# Patient Record
Sex: FEMALE | Race: WHITE
[De-identification: names, ages, dates, MRNs, and addresses within clinical notes are randomized per-mention and may not be internally consistent; named-entity substitution may affect disease eponyms.]

---

## 2018-03-21 ENCOUNTER — HOSPITAL ENCOUNTER (OUTPATIENT)
Dept: HOSPITAL 45 - C.CTS | Age: 33
Discharge: HOME | End: 2018-03-21
Attending: NURSE PRACTITIONER
Payer: COMMERCIAL

## 2018-03-21 ENCOUNTER — HOSPITAL ENCOUNTER (OUTPATIENT)
Dept: HOSPITAL 45 - C.LAB1850 | Age: 33
Discharge: HOME | End: 2018-03-21
Attending: NURSE PRACTITIONER
Payer: COMMERCIAL

## 2018-03-21 DIAGNOSIS — R06.02: ICD-10-CM

## 2018-03-21 DIAGNOSIS — Z30.41: ICD-10-CM

## 2018-03-21 DIAGNOSIS — R79.89: ICD-10-CM

## 2018-03-21 DIAGNOSIS — R07.89: ICD-10-CM

## 2018-03-21 DIAGNOSIS — R94.31: Primary | ICD-10-CM

## 2018-03-21 DIAGNOSIS — R53.83: Primary | ICD-10-CM

## 2018-03-21 LAB
BASOPHILS # BLD: 0.02 K/UL (ref 0–0.2)
BASOPHILS NFR BLD: 0.2 %
BUN SERPL-MCNC: 12 MG/DL (ref 7–18)
CALCIUM SERPL-MCNC: 8.9 MG/DL (ref 8.5–10.1)
CO2 SERPL-SCNC: 24 MMOL/L (ref 21–32)
CREAT SERPL-MCNC: 0.96 MG/DL (ref 0.6–1.2)
EOS ABS #: 0.06 K/UL (ref 0–0.5)
EOSINOPHIL NFR BLD AUTO: 328 K/UL (ref 130–400)
GLUCOSE SERPL-MCNC: 75 MG/DL (ref 70–99)
HCT VFR BLD CALC: 44.4 % (ref 37–47)
HGB BLD-MCNC: 15.2 G/DL (ref 12–16)
IG#: 0.02 K/UL (ref 0–0.02)
IMM GRANULOCYTES NFR BLD AUTO: 24.4 %
LYMPHOCYTES # BLD: 2.17 K/UL (ref 1.2–3.4)
MCH RBC QN AUTO: 29.3 PG (ref 25–34)
MCHC RBC AUTO-ENTMCNC: 34.2 G/DL (ref 32–36)
MCV RBC AUTO: 85.7 FL (ref 80–100)
MONO ABS #: 0.7 K/UL (ref 0.11–0.59)
MONOCYTES NFR BLD: 7.9 %
NEUT ABS #: 5.93 K/UL (ref 1.4–6.5)
NEUTROPHILS # BLD AUTO: 0.7 %
NEUTROPHILS NFR BLD AUTO: 66.6 %
PMV BLD AUTO: 10.2 FL (ref 7.4–10.4)
POTASSIUM SERPL-SCNC: 4 MMOL/L (ref 3.5–5.1)
RED CELL DISTRIBUTION WIDTH CV: 12.9 % (ref 11.5–14.5)
RED CELL DISTRIBUTION WIDTH SD: 40.4 FL (ref 36.4–46.3)
SODIUM SERPL-SCNC: 135 MMOL/L (ref 136–145)
WBC # BLD AUTO: 8.9 K/UL (ref 4.8–10.8)

## 2018-03-21 NOTE — DIAGNOSTIC IMAGING REPORT
(CHEST FOR PE) ANGIO WITH



CT DOSE: 186.41 mGy.cm



HISTORY:  32 years-old Female with acute atypical chest pain with elevated

d-dimer level.       



TECHNIQUE: Multiple CTA images of the chest were obtained after the intravenous

administration of 83 ml Optiray 320.  Coronal and sagittal MIPS were obtained

from the axial data set and were submitted for review.  A dose lowering

technique was utilized adhering to the principles of ALARA.



COMPARISON: None.



FINDINGS: 



CTA:  

Heart is normal in size without pericardial effusion. The thoracic aorta is

normal in both course and caliber without aneurysm or dissection. The imaged

great vessels appear to be patent. The pulmonary arterial tree is opacified to

level of the segmental branches and demonstrates no focal filling defects to

suggest pulmonary thromboembolic disease. The distal segmental and subsegmental

branches are not well opacified secondary to contrast bolus timing.

 

CT CHEST:  

No dominant thyroid nodule or pathologic adenopathy identified. Minimal residual

thymic tissue of the anterior mediastinum. There is no pneumothorax, pleural

effusion, focal airspace consolidation or overt pulmonary edema. There is

minimal subsegmental bibasilar atelectasis. Calcified granuloma of the superior

segment left lower lobe. Suggested 2 mm nodule of the right lower lobe on image

133 series 4 is likely benign in a patient of this age group. Central airways

are patent.



No acute abnormality of the imaged upper abdomen. Pectus excavatum deformity of

the chest. The bilateral breast parenchyma appears unremarkable. The bones of

the chest appear intact. Bone island of the T11 vertebral body.





IMPRESSION:  

1. No acute intrathoracic abnormality identified, specifically no acute aortic

pathology or evidence of pulmonary thromboembolic disease.

2. No focal airspace consolidation or pathologic adenopathy.

3. Pectus excavatum deformity of the chest.







The above report was generated using voice recognition software. It may contain

grammatical, syntax or spelling errors.







Electronically signed by:  Kapil Allan M.D.

3/21/2018 5:11 PM



Dictated Date/Time:  3/21/2018 5:05 PM